# Patient Record
Sex: MALE | Race: WHITE
[De-identification: names, ages, dates, MRNs, and addresses within clinical notes are randomized per-mention and may not be internally consistent; named-entity substitution may affect disease eponyms.]

---

## 2019-01-28 ENCOUNTER — HOSPITAL ENCOUNTER (EMERGENCY)
Dept: HOSPITAL 91 - FTE | Age: 34
Discharge: HOME | End: 2019-01-28
Payer: SELF-PAY

## 2019-01-28 ENCOUNTER — HOSPITAL ENCOUNTER (EMERGENCY)
Dept: HOSPITAL 10 - FTE | Age: 34
Discharge: HOME | End: 2019-01-28
Payer: SELF-PAY

## 2019-01-28 VITALS
BODY MASS INDEX: 28.03 KG/M2 | WEIGHT: 174.39 LBS | WEIGHT: 174.39 LBS | HEIGHT: 66 IN | HEIGHT: 66 IN | BODY MASS INDEX: 28.03 KG/M2

## 2019-01-28 VITALS — SYSTOLIC BLOOD PRESSURE: 178 MMHG | RESPIRATION RATE: 20 BRPM | DIASTOLIC BLOOD PRESSURE: 58 MMHG | HEART RATE: 70 BPM

## 2019-01-28 DIAGNOSIS — F17.210: ICD-10-CM

## 2019-01-28 DIAGNOSIS — B35.9: Primary | ICD-10-CM

## 2019-01-28 PROCEDURE — 99282 EMERGENCY DEPT VISIT SF MDM: CPT
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
